# Patient Record
Sex: MALE | Race: WHITE | NOT HISPANIC OR LATINO
[De-identification: names, ages, dates, MRNs, and addresses within clinical notes are randomized per-mention and may not be internally consistent; named-entity substitution may affect disease eponyms.]

---

## 2017-01-26 ENCOUNTER — APPOINTMENT (OUTPATIENT)
Dept: ORTHOPEDIC SURGERY | Facility: CLINIC | Age: 62
End: 2017-01-26

## 2017-01-26 VITALS
DIASTOLIC BLOOD PRESSURE: 76 MMHG | WEIGHT: 170 LBS | HEIGHT: 70 IN | HEART RATE: 81 BPM | SYSTOLIC BLOOD PRESSURE: 111 MMHG | TEMPERATURE: 98.2 F | BODY MASS INDEX: 24.34 KG/M2

## 2017-01-26 DIAGNOSIS — G56.21 LESION OF ULNAR NERVE, RIGHT UPPER LIMB: ICD-10-CM

## 2017-01-26 DIAGNOSIS — G56.01 CARPAL TUNNEL SYNDROME, RIGHT UPPER LIMB: ICD-10-CM

## 2017-05-01 ENCOUNTER — OUTPATIENT (OUTPATIENT)
Dept: OUTPATIENT SERVICES | Facility: HOSPITAL | Age: 62
LOS: 1 days | End: 2017-05-01
Payer: COMMERCIAL

## 2017-05-09 DIAGNOSIS — R53.1 WEAKNESS: ICD-10-CM

## 2017-05-09 DIAGNOSIS — S06.0X9D CONCUSSION WITH LOSS OF CONSCIOUSNESS OF UNSPECIFIED DURATION, SUBSEQUENT ENCOUNTER: ICD-10-CM

## 2017-05-09 DIAGNOSIS — M54.2 CERVICALGIA: ICD-10-CM

## 2017-05-09 DIAGNOSIS — R42 DIZZINESS AND GIDDINESS: ICD-10-CM

## 2017-05-09 DIAGNOSIS — G56.01 CARPAL TUNNEL SYNDROME, RIGHT UPPER LIMB: ICD-10-CM

## 2017-05-11 ENCOUNTER — APPOINTMENT (OUTPATIENT)
Dept: PHYSICAL MEDICINE AND REHAB | Facility: CLINIC | Age: 62
End: 2017-05-11

## 2017-05-11 VITALS
DIASTOLIC BLOOD PRESSURE: 83 MMHG | OXYGEN SATURATION: 99 % | HEART RATE: 82 BPM | SYSTOLIC BLOOD PRESSURE: 136 MMHG | HEIGHT: 70 IN | BODY MASS INDEX: 24.34 KG/M2 | WEIGHT: 170 LBS

## 2017-05-11 DIAGNOSIS — Z83.3 FAMILY HISTORY OF DIABETES MELLITUS: ICD-10-CM

## 2017-05-11 DIAGNOSIS — R42 DIZZINESS AND GIDDINESS: ICD-10-CM

## 2017-05-11 DIAGNOSIS — S06.0X9A CONCUSSION WITH LOSS OF CONSCIOUSNESS OF UNSPECIFIED DURATION, INITIAL ENCOUNTER: ICD-10-CM

## 2017-06-02 PROCEDURE — 97140 MANUAL THERAPY 1/> REGIONS: CPT

## 2017-06-02 PROCEDURE — 97010 HOT OR COLD PACKS THERAPY: CPT

## 2017-06-02 PROCEDURE — 97110 THERAPEUTIC EXERCISES: CPT

## 2018-09-06 ENCOUNTER — OUTPATIENT (OUTPATIENT)
Dept: OUTPATIENT SERVICES | Facility: HOSPITAL | Age: 63
LOS: 1 days | End: 2018-09-06
Payer: COMMERCIAL

## 2018-09-06 DIAGNOSIS — M54.5 LOW BACK PAIN: ICD-10-CM

## 2018-09-06 DIAGNOSIS — M54.2 CERVICALGIA: ICD-10-CM

## 2018-09-06 DIAGNOSIS — Z51.89 ENCOUNTER FOR OTHER SPECIFIED AFTERCARE: ICD-10-CM

## 2019-01-04 PROCEDURE — G8982: CPT | Mod: CJ

## 2019-01-04 PROCEDURE — 97163 PT EVAL HIGH COMPLEX 45 MIN: CPT

## 2019-01-04 PROCEDURE — G8981: CPT | Mod: CL

## 2019-01-04 PROCEDURE — 97110 THERAPEUTIC EXERCISES: CPT

## 2019-01-04 PROCEDURE — 97010 HOT OR COLD PACKS THERAPY: CPT

## 2019-01-04 PROCEDURE — 97140 MANUAL THERAPY 1/> REGIONS: CPT

## 2019-05-07 ENCOUNTER — OUTPATIENT (OUTPATIENT)
Dept: OUTPATIENT SERVICES | Facility: HOSPITAL | Age: 64
LOS: 1 days | End: 2019-05-07
Payer: COMMERCIAL

## 2019-05-07 DIAGNOSIS — Z51.89 ENCOUNTER FOR OTHER SPECIFIED AFTERCARE: ICD-10-CM

## 2019-05-07 DIAGNOSIS — M54.5 LOW BACK PAIN: ICD-10-CM

## 2019-05-07 DIAGNOSIS — M54.2 CERVICALGIA: ICD-10-CM

## 2019-06-26 PROCEDURE — 97140 MANUAL THERAPY 1/> REGIONS: CPT

## 2019-06-26 PROCEDURE — 97010 HOT OR COLD PACKS THERAPY: CPT

## 2019-06-26 PROCEDURE — 97110 THERAPEUTIC EXERCISES: CPT

## 2019-06-26 PROCEDURE — 97162 PT EVAL MOD COMPLEX 30 MIN: CPT

## 2022-04-13 ENCOUNTER — APPOINTMENT (OUTPATIENT)
Dept: ORTHOPEDIC SURGERY | Facility: CLINIC | Age: 67
End: 2022-04-13
Payer: OTHER MISCELLANEOUS

## 2022-04-13 VITALS — WEIGHT: 170 LBS | BODY MASS INDEX: 24.34 KG/M2 | HEIGHT: 70 IN

## 2022-04-13 PROCEDURE — 99214 OFFICE O/P EST MOD 30 MIN: CPT

## 2022-04-13 PROCEDURE — 99072 ADDL SUPL MATRL&STAF TM PHE: CPT

## 2022-04-13 NOTE — WORK
[Total] : total [Does not reveal pre-existing condition(s) that may affect treatment/prognosis] : does not reveal pre-existing condition(s) that may affect treatment/prognosis [Cannot return to work because ________] : cannot return to work because [unfilled] [Rx may affect patient's ability to return to work, make patient drowsy, or other issue] : Rx may affect patient's ability to return to work, make patient drowsy, or other issue. [I provided the services listed above] :  I provided the services listed above.

## 2022-04-13 NOTE — DISCUSSION/SUMMARY
[de-identified] : Stable on medications - it is medically necessary for him to have the gabapentin and the celebrex as these medications have provided long term relief - continue PT once monthly - will add lidoderm patches - no change in disability - fu 3 months.

## 2022-04-13 NOTE — HISTORY OF PRESENT ILLNESS
[Neck] : neck [Upper back] : upper back [Mid-back] : mid-back [Lower back] : lower back [10] : 10 [6] : 6 [Burning] : burning [Sharp] : sharp [Throbbing] : throbbing [Intermittent] : intermittent [Walking] : walking [Exercising] : exercising [Stairs] : stairs [Retired] : Work status: retired [de-identified] : WC 10/9/15\par \par 6/24/20: here for fu neck/back - neck bothering him quite a bit - right arm bothering him quite a bit \par \par Lower back pain remains - radiates down the back of the rigth leg\par \par Using gabapentin/Celebrex\par \par xrays today:\par C spine 4 views - loss of disc height from C5-7\par \par 9/28/20: Here for f/u - plan at last was "medication renewed - PT in order to reduce the symptoms - requires medication in order to control the symptoms - the pain remains either way but there is less with the medication and without he basically cant move at all- fu with me in a few months" - overall the pain remains in the same capacity - pain ranging from 5-10 based on activity - left arm is okay - all the way down the right arm - medications help - has medications - right hand is okay - no loss of bb control- PT helps quite a bit currently\par \par Lower back pain as well \par \par Not able to work in any capacity at this point\par \par 1/11/21: Here to follow up. Plan at last visit was "Celebrex/gabapenitin refilled - not able to return to work - hes going to see Dr Bradley for injections"\par \par 5/19/21: Here for f/u. Plan at last "We discussed case. Having recent GI symptoms. Will add Pepcid 20mg bid and decrease Celebrex to 100mg. Will try to hold Celebrex and see if better. Denies bloody stools. Will follow up with GI"\par \par 6/30/21: Here for f/u. Plan at last "Celebrex/gabapentin. He requires Celebrex due to other medications on the formulary not compatible with his GI issues.\par He requires Gabapentin for neuropathic pain and there is no substitute" He is at baseline with his pain and sx. He recently received his Celebrex a few days ago and it has been beneficial to his pain and sx\par \par 8/11/21: Here for f/u. Plan at last "Celebrex/gabapenitin. He requires Celebrex due to other medications on the formulary not compatible with his GI issues.\par He requires Gabapentin for neuropathic pain and there is no substitute." He continues to remain at his baseline with pain and dysfunction. He has been doing well with PT, Celebrex, and gabapentin.\par \par 10/6/21: Here for f/u. Plan at last "Celebrex/gabapenitin. He requires Celebrex due to other medications on the formulary not compatible with his GI issues.\par He requires Gabapentin for neuropathic pain and there is no substitute." He had a flare up of the pain a month ago, possibly due to doing yard work at the time. Today, he is at his baseline with pain and dysfunction. Continues to do well with PT, Celebrex, and gabapentin.\par \par 11/24/21: Here for f/up. He has been doing well with PT, Celebrex, and gabapentin. He does not need refill today. He is currently waiting on approval for injections with Dr. Bradley.\par \par 1/26/22: Here for f/up. He has less stomach issues now when switched from Celebrex 200mg to 100mg. Doing well with PT, Celebrex, and gabapentin. No significant changes in his symptoms and function.\par \par 4/13/22: Here for follow-up; plan last visit was, "Continue with PT, Celebrex, and gabapentin. Sent lido topical gel as he has benefited from this in the past.He is working on scheduling LESIs with Dr. Bradley. f/up in 2-3 months." Overall doing a little better - he had cervical ablations as well as lumbar HO with about 40% improvement.  Pain is to the base of the neck  radiating down the right arm and low back with predominanty radiatiing down the right leg. He does PT about one monthly which he finds beneficial, and also takes the gabapentin and celebrex. He is retired. [] : Post Surgical Visit: no [FreeTextEntry7] : down both legs  [de-identified] : yard work  [de-identified] : Physical Therapy and an epidural  [de-identified] : 2/2022

## 2022-04-13 NOTE — PHYSICAL EXAM
[Flexion] : flexion [Extension] : extension [Rotation to left] : rotation to left [Rotation to right] : rotation to right [1] : right 1st digit [2] : right 2nd digit [3] : right 3rd digit [4] : right 4th digit [5] : right 5th digit [] : sensation of right upper extremities not intact [TWNoteComboBox7] : forward flexion 30 degrees [de-identified] : extension 10 degrees

## 2022-04-18 ENCOUNTER — APPOINTMENT (OUTPATIENT)
Dept: PAIN MANAGEMENT | Facility: CLINIC | Age: 67
End: 2022-04-18
Payer: OTHER MISCELLANEOUS

## 2022-04-18 VITALS — WEIGHT: 170 LBS | BODY MASS INDEX: 24.34 KG/M2 | HEIGHT: 70 IN

## 2022-04-18 PROCEDURE — 99072 ADDL SUPL MATRL&STAF TM PHE: CPT

## 2022-04-18 PROCEDURE — 99214 OFFICE O/P EST MOD 30 MIN: CPT

## 2022-04-18 NOTE — ASSESSMENT
[FreeTextEntry1] : Patient is presenting with acute/sub-acute radicular pain with impairment in ADLs and functionality.  The pain has not responded to  conservative care including nsaid therapy and/or physical therapy.  There is no bleeding tendency, unstable medical condition, or systemic infection.\par \par After discussing various treatment options with the patient including but not limited to oral medications, physical therapy, exercise, modalities as well as interventional spinal injections, we have decided with the following plan:\par I personally reviewed the MRI/CT scan images and agree with the radiologist's report. The radiological findings were discussed with the patient.\par The risks, benefits, contents and alternatives to injection were explained in full to the patient. Risks outlined include but are not limited to infection,sepsis, bleeding, post-dural puncture headache, nerve damage, temporary increase in pain, syncopal episode, failure to resolve symptoms, allergic reaction, symptom recurrence, and elevation of blood sugar in diabetics. Cortisone may cause immunosuppression. Patient understands the risks. All questions were answered. After discussion of options, patient requested an injection. Information regarding the injection was given to the patient. Which medications to stop prior to the injection was explained to the patient as well.\par Follow up in 1-2 weeks post injection for re-evaluation. \par Continue Home exercises, stretching, activity modification, physical therapy, and conservative care.\par Patient is presenting with acute/sub-acute radicular pain with impairment in ADLs and functionality. The pain has not responded to conservative care including nsaid therapy and/or physical therapy. There is no bleeding tendency, unstable medical condition, or systemic infection.\par \par

## 2022-04-18 NOTE — PHYSICAL EXAM
[Rotation to left] : rotation to left [Rotation to right] : rotation to right [5___] : left hamstring 5[unfilled]/5 [4___] : right hamstring 4[unfilled]/5 [] : positive straight leg raise

## 2022-04-18 NOTE — HISTORY OF PRESENT ILLNESS
[Neck] : neck [Lower back] : lower back [10] : 10 [4] : 4 [Sharp] : sharp [Shooting] : shooting [] : yes [Constant] : constant [Heat] : heat [Sitting] : sitting [Standing] : standing [Walking] : walking [FreeTextEntry1] : 04/18/2022: follow up today.  Had 80% relief from right cervical RFA on 2/16 and 60% relief from LESI L5-S1 on 2/23.\par \par 11/10/21: follow up today. Not seen by me since 2018. Has been following with Dr. Mijares. Stable on current meds.\par Pain is in neck. Pain is in the right side of neck and occasionally gets numbness into right hand. Pain in back is in both\par sides and will go down right leg. Had LESI's with me back in 2017 with improvement of his pain >50% with improvement\par of his function and overall well being. Has been off of narcotics for some time. He has continued pain in the right neck,\par had 2 successful cervical MBBs in the past. using heat PRN.\par 2020: Per Dr. Lopez: He c/o neck and low back pain as a result of a work related injury on 10/9/15. No change in pain\par pattern. Obtains significant benefit with physical therapy, improvement in ROM and he is able to perform ADLs. Had right\par cervical MBB x2 with reported 15% improvement. States continued sustained relief after last left cervical RFA 6/2019.\par Attempted modalities for current pain complaint:\par See above:\par Medications: Yes\par 1) Celebrex PRN\par 2) Gabapentin 300 mg qhs\par Injections: Yes\par 1) Left C2-C3, C3-C4, C4-C5 RFA (6/25/18, 6/24/19) - Dr. Bradley\par 2) Right C2-C3, C3-C4, C4-C5 MBB (7/8/19, 12/9/19) - Dr. Bradley\par Previous Spine Surgery: N/A\par Imaging:\par MRI Cervical Spine (1/16/19) - O/C\par MRI Lumbar Spine (11/4/19) - O/C [FreeTextEntry7] : right leg [FreeTextEntry9] : Stretch

## 2022-07-13 ENCOUNTER — APPOINTMENT (OUTPATIENT)
Dept: ORTHOPEDIC SURGERY | Facility: CLINIC | Age: 67
End: 2022-07-13

## 2022-07-13 PROCEDURE — 99072 ADDL SUPL MATRL&STAF TM PHE: CPT

## 2022-07-13 PROCEDURE — 99214 OFFICE O/P EST MOD 30 MIN: CPT

## 2022-07-14 NOTE — HISTORY OF PRESENT ILLNESS
[Neck] : neck [Upper back] : upper back [Lower back] : lower back [Gradual] : gradual [Sudden] : sudden [Burning] : burning [Shooting] : shooting [Stabbing] : stabbing [Intermittent] : intermittent [Mid-back] : mid-back [10] : 10 [6] : 6 [Sharp] : sharp [Throbbing] : throbbing [Walking] : walking [Exercising] : exercising [Stairs] : stairs [Retired] : Work status: retired [de-identified] : WC 10/9/15\par \par 6/24/20: here for fu neck/back - neck bothering him quite a bit - right arm bothering him quite a bit \par \par Lower back pain remains - radiates down the back of the rigth leg\par \par Using gabapentin/Celebrex\par \par xrays today:\par C spine 4 views - loss of disc height from C5-7\par \par 9/28/20: Here for f/u - plan at last was "medication renewed - PT in order to reduce the symptoms - requires medication in order to control the symptoms - the pain remains either way but there is less with the medication and without he basically cant move at all- fu with me in a few months" - overall the pain remains in the same capacity - pain ranging from 5-10 based on activity - left arm is okay - all the way down the right arm - medications help - has medications - right hand is okay - no loss of bb control- PT helps quite a bit currently\par \par Lower back pain as well \par \par Not able to work in any capacity at this point\par \par 1/11/21: Here to follow up. Plan at last visit was "Celebrex/gabapenitin refilled - not able to return to work - hes going to see Dr Bradley for injections"\par \par 5/19/21: Here for f/u. Plan at last "We discussed case. Having recent GI symptoms. Will add Pepcid 20mg bid and decrease Celebrex to 100mg. Will try to hold Celebrex and see if better. Denies bloody stools. Will follow up with GI"\par \par 6/30/21: Here for f/u. Plan at last "Celebrex/gabapentin. He requires Celebrex due to other medications on the formulary not compatible with his GI issues.\par He requires Gabapentin for neuropathic pain and there is no substitute" He is at baseline with his pain and sx. He recently received his Celebrex a few days ago and it has been beneficial to his pain and sx\par \par 8/11/21: Here for f/u. Plan at last "Celebrex/gabapenitin. He requires Celebrex due to other medications on the formulary not compatible with his GI issues.\par He requires Gabapentin for neuropathic pain and there is no substitute." He continues to remain at his baseline with pain and dysfunction. He has been doing well with PT, Celebrex, and gabapentin.\par \par 10/6/21: Here for f/u. Plan at last "Celebrex/gabapenitin. He requires Celebrex due to other medications on the formulary not compatible with his GI issues.\par He requires Gabapentin for neuropathic pain and there is no substitute." He had a flare up of the pain a month ago, possibly due to doing yard work at the time. Today, he is at his baseline with pain and dysfunction. Continues to do well with PT, Celebrex, and gabapentin.\par \par 11/24/21: Here for f/up. He has been doing well with PT, Celebrex, and gabapentin. He does not need refill today. He is currently waiting on approval for injections with Dr. Bradley.\par \par 1/26/22: Here for f/up. He has less stomach issues now when switched from Celebrex 200mg to 100mg. Doing well with PT, Celebrex, and gabapentin. No significant changes in his symptoms and function.\par \par 4/13/22: Here for follow-up; plan last visit was, "Continue with PT, Celebrex, and gabapentin. Sent lido topical gel as he has benefited from this in the past.He is working on scheduling LESIs with Dr. Bradley. f/up in 2-3 months." Overall doing a little better - he had cervical ablations as well as lumbar HO with about 40% improvement.  Pain is to the base of the neck  radiating down the right arm and low back with predominanty radiatiing down the right leg. He does PT about one monthly which he finds beneficial, and also takes the gabapentin and celebrex. He is retired.\par \par 7/13/22: Here for follow up. Overall about the same. Lidoderm patches give mild help. Continues PT and HEP.  [] : Post Surgical Visit: no [FreeTextEntry1] : C spine and L spine  [FreeTextEntry7] : down both legs  [de-identified] : yard work  [de-identified] : Physical Therapy and an epidural  [de-identified] : 2/2022

## 2022-07-14 NOTE — DISCUSSION/SUMMARY
[de-identified] : Stable on medications - it is medically necessary for him to have the lidoderm patches, gabapentin and the celebrex as these medications have provided long term relief - continue PT once monthly - FU with Pain mngmt - no change in disability - fu 3 months.\par

## 2022-10-12 ENCOUNTER — APPOINTMENT (OUTPATIENT)
Dept: ORTHOPEDIC SURGERY | Facility: CLINIC | Age: 67
End: 2022-10-12

## 2022-10-12 PROCEDURE — 99214 OFFICE O/P EST MOD 30 MIN: CPT | Mod: 25

## 2022-10-12 PROCEDURE — 99072 ADDL SUPL MATRL&STAF TM PHE: CPT

## 2022-10-12 PROCEDURE — J3490M: CUSTOM

## 2022-10-12 PROCEDURE — 99213 OFFICE O/P EST LOW 20 MIN: CPT | Mod: 25

## 2022-10-12 PROCEDURE — 20552 NJX 1/MLT TRIGGER POINT 1/2: CPT

## 2022-10-12 RX ORDER — GABAPENTIN 300 MG/1
300 CAPSULE ORAL
Qty: 60 | Refills: 2 | Status: ACTIVE | COMMUNITY
Start: 2022-04-13 | End: 1900-01-01

## 2022-10-12 NOTE — PROCEDURE
[Trigger point 1-2 muscle groups] : trigger point 1-2 muscle groups [Right] : of the right [Cervical paraspinal muscle] : cervical paraspinal muscle [Pain] : pain [Inflammation] : inflammation [Alcohol] : alcohol [Betadine] : betadine [Ethyl Chloride sprayed topically] : ethyl chloride sprayed topically [Sterile technique used] : sterile technique used [___ cc    6mg] :  Betamethasone (Celestone) ~Vcc of 6mg [___ cc    1%] : Lidocaine ~Vcc of 1%  [___ cc    0.25%] : Bupivacaine (Marcaine) ~Vcc of 0.25%  [___ cc    10mg] : Triamcinolone (Kenalog) ~Vcc of 10 mg  [] : Patient tolerated procedure well [Call if redness, pain or fever occur] : call if redness, pain or fever occur [Apply ice for 15min out of every hour for the next 12-24 hours as tolerated] : apply ice for 15 minutes out of every hour for the next 12-24 hours as tolerated [Patient was advised to rest the joint(s) for ____ days] : patient was advised to rest the joint(s) for [unfilled] days

## 2022-10-12 NOTE — PHYSICAL EXAM
[Flexion] : flexion [Extension] : extension [Rotation to left] : rotation to left [Rotation to right] : rotation to right [1] : right 1st digit [2] : right 2nd digit [3] : right 3rd digit [4] : right 4th digit [5] : right 5th digit [] : sensation of right upper extremities not intact [TWNoteComboBox7] : forward flexion 30 degrees [de-identified] : extension 10 degrees

## 2022-10-12 NOTE — DISCUSSION/SUMMARY
[de-identified] : Stable on medications - it is medically necessary for him to have the lidoderm patches, gabapentin and the celebrex as these medications have provided long term relief - continue PT-  FU with Pain mngmt - TPI given to the right paraspinal area -no change in disability - fu 3 months.\par \par Instructions: Entered by Delfina KWAN acting as scribe.\par \par Dr. Mijares -\par The documentation recorded by the scribe accurately reflects the service I personally performed and the decisions made by me.\par \par

## 2022-10-12 NOTE — HISTORY OF PRESENT ILLNESS
[Neck] : neck [Upper back] : upper back [Mid-back] : mid-back [Lower back] : lower back [Gradual] : gradual [Sudden] : sudden [10] : 10 [6] : 6 [Burning] : burning [Sharp] : sharp [Shooting] : shooting [Stabbing] : stabbing [Throbbing] : throbbing [Intermittent] : intermittent [Walking] : walking [Exercising] : exercising [Stairs] : stairs [Retired] : Work status: retired [de-identified] : WC 10/9/15\par \par 6/24/20: here for fu neck/back - neck bothering him quite a bit - right arm bothering him quite a bit \par \par Lower back pain remains - radiates down the back of the rigth leg\par \par Using gabapentin/Celebrex\par \par xrays today:\par C spine 4 views - loss of disc height from C5-7\par \par 9/28/20: Here for f/u - plan at last was "medication renewed - PT in order to reduce the symptoms - requires medication in order to control the symptoms - the pain remains either way but there is less with the medication and without he basically cant move at all- fu with me in a few months" - overall the pain remains in the same capacity - pain ranging from 5-10 based on activity - left arm is okay - all the way down the right arm - medications help - has medications - right hand is okay - no loss of bb control- PT helps quite a bit currently\par \par Lower back pain as well \par \par Not able to work in any capacity at this point\par \par 1/11/21: Here to follow up. Plan at last visit was "Celebrex/gabapenitin refilled - not able to return to work - hes going to see Dr Bradley for injections"\par \par 5/19/21: Here for f/u. Plan at last "We discussed case. Having recent GI symptoms. Will add Pepcid 20mg bid and decrease Celebrex to 100mg. Will try to hold Celebrex and see if better. Denies bloody stools. Will follow up with GI"\par \par 6/30/21: Here for f/u. Plan at last "Celebrex/gabapentin. He requires Celebrex due to other medications on the formulary not compatible with his GI issues.\par He requires Gabapentin for neuropathic pain and there is no substitute" He is at baseline with his pain and sx. He recently received his Celebrex a few days ago and it has been beneficial to his pain and sx\par \par 8/11/21: Here for f/u. Plan at last "Celebrex/gabapenitin. He requires Celebrex due to other medications on the formulary not compatible with his GI issues.\par He requires Gabapentin for neuropathic pain and there is no substitute." He continues to remain at his baseline with pain and dysfunction. He has been doing well with PT, Celebrex, and gabapentin.\par \par 10/6/21: Here for f/u. Plan at last "Celebrex/gabapenitin. He requires Celebrex due to other medications on the formulary not compatible with his GI issues.\par He requires Gabapentin for neuropathic pain and there is no substitute." He had a flare up of the pain a month ago, possibly due to doing yard work at the time. Today, he is at his baseline with pain and dysfunction. Continues to do well with PT, Celebrex, and gabapentin.\par \par 11/24/21: Here for f/up. He has been doing well with PT, Celebrex, and gabapentin. He does not need refill today. He is currently waiting on approval for injections with Dr. Bradley.\par \par 1/26/22: Here for f/up. He has less stomach issues now when switched from Celebrex 200mg to 100mg. Doing well with PT, Celebrex, and gabapentin. No significant changes in his symptoms and function.\par \par 4/13/22: Here for follow-up; plan last visit was, "Continue with PT, Celebrex, and gabapentin. Sent lido topical gel as he has benefited from this in the past.He is working on scheduling LESIs with Dr. Bradley. f/up in 2-3 months." Overall doing a little better - he had cervical ablations as well as lumbar HO with about 40% improvement.  Pain is to the base of the neck  radiating down the right arm and low back with predominanty radiatiing down the right leg. He does PT about one monthly which he finds beneficial, and also takes the gabapentin and celebrex. He is retired.\par \par 7/13/22: Here for follow up. Overall about the same. Lidoderm patches give mild help. Continues PT and HEP. \par \par 10/12/22: Here for fu; plan last visit was, " Stable on medications - it is medically necessary for him to have the lidoderm patches, gabapentin and the celebrex as these medications have provided long term relief - continue PT once monthly - FU with Pain mngmt - no change in disability - fu 3 months." Overall pain to the right side of the neck that persists with stiffness. Shooting pain down the right arm at times. The neck is worse today. Pain persists also to the low back; he takes the celebrex, lidoderm patches and gabapentin with partial relief of his pain. He is retired.  [] : Post Surgical Visit: no [FreeTextEntry1] : C spine and L spine  [FreeTextEntry7] : down both legs  [de-identified] : yard work  [de-identified] : Physical Therapy and an epidural  [de-identified] : 2/2022

## 2022-11-07 ENCOUNTER — APPOINTMENT (OUTPATIENT)
Dept: PAIN MANAGEMENT | Facility: CLINIC | Age: 67
End: 2022-11-07

## 2023-01-11 ENCOUNTER — APPOINTMENT (OUTPATIENT)
Dept: ORTHOPEDIC SURGERY | Facility: CLINIC | Age: 68
End: 2023-01-11
Payer: OTHER MISCELLANEOUS

## 2023-01-11 VITALS — HEIGHT: 70 IN | WEIGHT: 176 LBS | BODY MASS INDEX: 25.2 KG/M2

## 2023-01-11 PROCEDURE — 99072 ADDL SUPL MATRL&STAF TM PHE: CPT

## 2023-01-11 PROCEDURE — 99214 OFFICE O/P EST MOD 30 MIN: CPT

## 2023-01-11 NOTE — PHYSICAL EXAM
[Flexion] : flexion [Extension] : extension [Rotation to left] : rotation to left [Rotation to right] : rotation to right [1] : right 1st digit [2] : right 2nd digit [3] : right 3rd digit [4] : right 4th digit [5] : right 5th digit [] : sensation of right upper extremities not intact [TWNoteComboBox7] : forward flexion 30 degrees [de-identified] : extension 10 degrees

## 2023-01-11 NOTE — HISTORY OF PRESENT ILLNESS
[Neck] : neck [Upper back] : upper back [Mid-back] : mid-back [Lower back] : lower back [Gradual] : gradual [Sudden] : sudden [10] : 10 [6] : 6 [Burning] : burning [Sharp] : sharp [Shooting] : shooting [Stabbing] : stabbing [Throbbing] : throbbing [Intermittent] : intermittent [Walking] : walking [Exercising] : exercising [Stairs] : stairs [Retired] : Work status: retired [de-identified] : WC 10/9/15\par \par 6/24/20: here for fu neck/back - neck bothering him quite a bit - right arm bothering him quite a bit \par \par Lower back pain remains - radiates down the back of the rigth leg\par \par Using gabapentin/Celebrex\par \par xrays today:\par C spine 4 views - loss of disc height from C5-7\par \par 9/28/20: Here for f/u - plan at last was "medication renewed - PT in order to reduce the symptoms - requires medication in order to control the symptoms - the pain remains either way but there is less with the medication and without he basically cant move at all- fu with me in a few months" - overall the pain remains in the same capacity - pain ranging from 5-10 based on activity - left arm is okay - all the way down the right arm - medications help - has medications - right hand is okay - no loss of bb control- PT helps quite a bit currently\par \par Lower back pain as well \par \par Not able to work in any capacity at this point\par \par 1/11/21: Here to follow up. Plan at last visit was "Celebrex/gabapenitin refilled - not able to return to work - hes going to see Dr Bradley for injections"\par \par 5/19/21: Here for f/u. Plan at last "We discussed case. Having recent GI symptoms. Will add Pepcid 20mg bid and decrease Celebrex to 100mg. Will try to hold Celebrex and see if better. Denies bloody stools. Will follow up with GI"\par \par 6/30/21: Here for f/u. Plan at last "Celebrex/gabapentin. He requires Celebrex due to other medications on the formulary not compatible with his GI issues.\par He requires Gabapentin for neuropathic pain and there is no substitute" He is at baseline with his pain and sx. He recently received his Celebrex a few days ago and it has been beneficial to his pain and sx\par \par 8/11/21: Here for f/u. Plan at last "Celebrex/gabapenitin. He requires Celebrex due to other medications on the formulary not compatible with his GI issues.\par He requires Gabapentin for neuropathic pain and there is no substitute." He continues to remain at his baseline with pain and dysfunction. He has been doing well with PT, Celebrex, and gabapentin.\par \par 10/6/21: Here for f/u. Plan at last "Celebrex/gabapenitin. He requires Celebrex due to other medications on the formulary not compatible with his GI issues.\par He requires Gabapentin for neuropathic pain and there is no substitute." He had a flare up of the pain a month ago, possibly due to doing yard work at the time. Today, he is at his baseline with pain and dysfunction. Continues to do well with PT, Celebrex, and gabapentin.\par \par 11/24/21: Here for f/up. He has been doing well with PT, Celebrex, and gabapentin. He does not need refill today. He is currently waiting on approval for injections with Dr. Bradley.\par \par 1/26/22: Here for f/up. He has less stomach issues now when switched from Celebrex 200mg to 100mg. Doing well with PT, Celebrex, and gabapentin. No significant changes in his symptoms and function.\par \par 4/13/22: Here for follow-up; plan last visit was, "Continue with PT, Celebrex, and gabapentin. Sent lido topical gel as he has benefited from this in the past.He is working on scheduling LESIs with Dr. Bradley. f/up in 2-3 months." Overall doing a little better - he had cervical ablations as well as lumbar HO with about 40% improvement.  Pain is to the base of the neck  radiating down the right arm and low back with predominanty radiatiing down the right leg. He does PT about one monthly which he finds beneficial, and also takes the gabapentin and celebrex. He is retired.\par \par 7/13/22: Here for follow up. Overall about the same. Lidoderm patches give mild help. Continues PT and HEP. \par \par 10/12/22: Here for fu; plan last visit was, " Stable on medications - it is medically necessary for him to have the lidoderm patches, gabapentin and the celebrex as these medications have provided long term relief - continue PT once monthly - FU with Pain mngmt - no change in disability - fu 3 months." Overall pain to the right side of the neck that persists with stiffness. Shooting pain down the right arm at times. The neck is worse today. Pain persists also to the low back; he takes the celebrex, lidoderm patches and gabapentin with partial relief of his pain. He is retired. \par \par \par 01/11/23: Here for fu; plan last visit was, " Stable on medications - it is medically necessary for him to have the lidoderm patches, gabapentin and the celebrex as these medications have provided long term relief - continue PT-  FU with Pain mngmt - TPI given to the right paraspinal area -no change in disability - fu 3 months."  Overall is stable on medication. The PT has been helpful; He has continued pain radiating down the right arm and right leg that is intermittent. He was unable to make his last pain management appointment. \par  [] : Post Surgical Visit: no [FreeTextEntry1] : C spine and L spine  [FreeTextEntry7] : down both legs  [de-identified] : yard work  [de-identified] : Physical Therapy and an epidural \par \par tpi injection  [de-identified] : 2/2022

## 2023-01-11 NOTE — DISCUSSION/SUMMARY
[de-identified] : Stable on medications - it is medically necessary for him to have the lidoderm patches, gabapentin and the celebrex as these medications have provided long term relief; will increase the gabapentin to TID - continue PT-  FU with Pain mngmt no change in disability - fu 3 months.\par \par Instructions: Entered by Delfina KWAN acting as scribe.\par \par Dr. Mijares -\par The documentation recorded by the scribe accurately reflects the service I personally performed and the decisions made by me.\par \par

## 2023-02-08 ENCOUNTER — APPOINTMENT (OUTPATIENT)
Dept: PAIN MANAGEMENT | Facility: CLINIC | Age: 68
End: 2023-02-08
Payer: OTHER MISCELLANEOUS

## 2023-02-08 VITALS — WEIGHT: 176 LBS | BODY MASS INDEX: 25.2 KG/M2 | HEIGHT: 70 IN

## 2023-02-08 PROCEDURE — 99072 ADDL SUPL MATRL&STAF TM PHE: CPT

## 2023-02-08 PROCEDURE — 99214 OFFICE O/P EST MOD 30 MIN: CPT

## 2023-02-08 NOTE — ASSESSMENT
[FreeTextEntry1] : After discussing various treatment options with the patient including but not limited to oral medications, physical therapy, exercise, modalities as well as interventional spinal injections, we have decided with the following plan:\par \par 1) Intervention Injection Therapy:\par I personally reviewed the MRI/CT scan images and agree with the radiologist's report. The radiological findings were discussed with the patient.\par The risks, benefits, contents and alternatives to injection were explained in full to the patient. Risks outlined include but are not limited to infection,sepsis, bleeding, post-dural puncture headache, nerve damage, temporary increase in pain, syncopal episode, failure to resolve symptoms, allergic reaction, symptom recurrence, and elevation of blood sugar in diabetics. Cortisone may cause immunosuppression. Patient understands the risks. All questions were answered. After discussion of options, patient requested an injection. Information regarding the injection was given to the patient. Which medications to stop prior to the injection was explained to the patient as well.\par \par Follow up in 1-2 weeks post injection for re-evaluation. \par Continue Home exercises, stretching, activity modification, physical therapy, and conservative care.\par \par Patient has cervical axial pain that is consistent with facet joint pathology.  A diagnostic temporary block with local anesthetic  of the medial branch was performed and has resulted in at least a 80% reduction in pain for the duration of the specific local anesthetic effect.  The pain is not radicular and there is absence of nerve root compression.  There is no prior spinal fusion surgery at the level targeted.  The pain has failed to respond to three months of conservative therapy.\par \par right cervical RFA C3, C4, C5, C6 as the previous RFA provided him > 80% relief for about a year. ROM now decreasing and pain getting worse. \par

## 2023-02-08 NOTE — PHYSICAL EXAM
[Rotation to left] : rotation to left [Rotation to right] : rotation to right [5___] : left hamstring 5[unfilled]/5 [4___] : right hamstring 4[unfilled]/5 [] : no ecchymosis [de-identified] : left lateral rotation 45 degrees [TWNoteComboBox6] : right lateral rotation 45 degrees

## 2023-02-08 NOTE — HISTORY OF PRESENT ILLNESS
[Neck] : neck [Lower back] : lower back [Sharp] : sharp [Shooting] : shooting [Heat] : heat [Sitting] : sitting [Standing] : standing [Walking] : walking [Work related] : work related [5] : 5 [3] : 3 [Intermittent] : intermittent [Retired] : Work status: retired [FreeTextEntry1] : 02/8/23: follow up today. He was unable to get his injection in November as his ride had cancelled on him. His pain is in his lower back and his neck.  Intermittent numbness in the right hand. Pain in the lower back with intermittent radiation down both posterior legs. \par \par 04/18/2022: follow up today.  Had 80% relief from right cervical RFA on 2/16/22 and 60% relief from LESI L5-S1 on 2/23/22.\par \par 11/10/21: follow up today. Not seen by me since 2018. Has been following with Dr. Mijares. Stable on current meds.Pain is in neck. Pain is in the right side of neck and occasionally gets numbness into right hand. Pain in back is in both sides and will go down right leg. Had LESI's with me back in 2017 with improvement of his pain >50% with improvement of his function and overall well being. Has been off of narcotics for some time. He has continued pain in the right neck, had 2 successful cervical MBBs in the past. using heat PRN.\par \par \par 2020: Per Dr. Lopez: He c/o neck and low back pain as a result of a work related injury on 10/9/15. No change in pain pattern. Obtains significant benefit with physical therapy, improvement in ROM and he is able to perform ADLs. Had right cervical MBB x2 with reported 15% improvement. States continued sustained relief after last left cervical RFA 6/2019.\par \par Attempted modalities for current pain complaint:\par See above:\par \par Medications: Yes\par 1) Celebrex PRN\par 2) Gabapentin 300 mg qhs\par \par Injections: Yes\par 1) Left C2-C3, C3-C4, C4-C5 RFA (6/25/18, 6/24/19) \par 2) Right C2-C3, C3-C4, C4-C5 MBB (7/8/19, 12/9/19) \par 3) Right cervical RFA (2/16/22)\par 4) LESI L5/S1 (2/23/22)\par \par Previous Spine Surgery: N/A\par Imaging:\par MRI Cervical Spine (1/16/19) - O/C C2-C3: There is no posterior disc herniation. \par C3-C4: There is diffuse disc bulging, bony ridging, right foraminal protrusion, uncovertebral joint hypertrophy, and \par right exiting C4 nerve root impingement with right greater than left foraminal narrowing. \par C4-C5: There is diffuse disc bulging and bony ridging asymmetric towards the left with uncovertebral joint \par hypertrophy and moderate left greater than right neural foraminal narrowing. \par C5-C6: There is mild diffuse disc bulging, bony ridging, and mild foraminal narrowing left greater than right. \par C6-C7: There is mild diffuse disc bulging, bony ridging, and foraminal narrowing, left greater than right. \par C7-T1: There is diffuse disc bulging, bony ridging, and uncovertebral joint hypertrophy with encroachment upon left  greater than right exiting C8 nerve roots. \par \par MRI Lumbar Spine (11/4/19) - O/C [] : no [FreeTextEntry3] : 10/9/15 [FreeTextEntry7] : right leg [FreeTextEntry9] : Stretch

## 2023-03-06 ENCOUNTER — APPOINTMENT (OUTPATIENT)
Dept: PAIN MANAGEMENT | Facility: CLINIC | Age: 68
End: 2023-03-06
Payer: OTHER MISCELLANEOUS

## 2023-03-06 PROCEDURE — 99072 ADDL SUPL MATRL&STAF TM PHE: CPT

## 2023-03-06 PROCEDURE — J3490M: CUSTOM

## 2023-03-06 PROCEDURE — 64633 DESTROY CERV/THOR FACET JNT: CPT | Mod: RT

## 2023-03-06 PROCEDURE — 64634Z: CUSTOM | Mod: 59,RT

## 2023-03-06 NOTE — PROCEDURE
[FreeTextEntry3] : Date of Service: 03/06/2023 \par \par Account: [id number]\par \par Patient: ANGEL DIAZ \par \par YOB: 1955\par \par Age: 67 year\par \par \par RIGHT RFA CERVICAL UPPER FACET\par \par Surgeon: Stephania Bradley M.D.\par \par Assistant: None.\par \par Pre-Operative Diagnosis: \par \par Post Operative Diagnosis: \par \par Procedure: Right Third Occipital Nerve, C3, C4, C5 Medial Branch Radiofrequency Neurolysis under fluoroscopic guidance.               \par \par Anesthesia:             MAC\par \par \par This procedure was carried out using fluoroscopic guidance.  The risks and benefits of the procedure were discussed extensively with the patient.  The consent of the patient was obtained and the following procedure was performed.\par \par The patient was placed in the prone position.  The patient's neck was prepped and draped in a sterile fashion.  The C2,C3,C4,C5 vertebral levels were identified and the fluoroscope right obliqued to approximately 10 degrees to reveal the "waste" of the left articular pillars at the C2,C3,C4,C5 levels and these were marked.  The skin at these target points was then localized using 1 cc of 1% Lidocaine without epinephrine at each injection site.  A 20 Gauge 100mm clarissa needle with a 5mm active curved tip was then introduced and advanced at these four levels to the above target points, and under lateral view the needle tip was advanced a few millimeters and confirmed at the middle of the classical "trapezius" structure on oss. \par \par The stylette for the most cephalad needle at the C2 level was then removed and a Whiteface radiofrequency probe was then placed inside the needle. After impedences were checked at approximately 240 ohm, 50 hertz sensory stimulation was performed.  Patient experienced concordant pain in their right neck at approximately 0.6 volts. The voltage was then increased to 1 volt. The patient reported increased right neck pain symptoms without any radiation below the left shoulder.  Stimulation was then changed to 2 hertz and increased slowly by 0.1 volt increments at approximately 0.8 volts, patient began to experience thumping like reproductive pain in their right neck. The voltage was then increased to approximately 2.5 volts. Patient experienced increasing thumping without any sensation below his right shoulder. This exact stimulation was then repeated for the C3,C4,C5 needles reproductive right sided neck pain and no radiation below the right shoulder. The 4 levels were then anesthetized with approximately 0.5 cc's of 1% Lidocaine. After which each area was then ablated at 80 degrees centigrade for 60 seconds each. Patient felt no pain reproduction during the ablation procedure.  After each of these levels were ablated, approximately 0.5 cc of 0.25% Bupivacaine plus 12 mg of Celestone were then injected before the needles were removed.  Pressure was then applied to the neck.  Band-aids were applied.  Patient was brought to the recovery, ambulated on their own after the procedure and reported decreased right sided neck pain. \par \par Anesthesia personnel were present throughout the procedure. Patient was told to apply ice to the neck for 20 minutes on and 20 minutes off for 24-48 hours. Patient is to call the office if they had any questions or concerns.   The patient was also instructed to contact me immediately if there were any problems.\par \par Stephania Bradley M.D.\par

## 2023-03-20 ENCOUNTER — APPOINTMENT (OUTPATIENT)
Dept: PAIN MANAGEMENT | Facility: CLINIC | Age: 68
End: 2023-03-20
Payer: OTHER MISCELLANEOUS

## 2023-03-20 VITALS — HEIGHT: 70 IN | BODY MASS INDEX: 25.2 KG/M2 | WEIGHT: 176 LBS

## 2023-03-20 PROCEDURE — 99214 OFFICE O/P EST MOD 30 MIN: CPT

## 2023-03-20 NOTE — ASSESSMENT
[FreeTextEntry1] : After discussing various treatment options with the patient including but not limited to oral medications, physical therapy, exercise, modalities as well as interventional spinal injections, we have decided with the following plan:\par \par 1) Intervention Injection Therapy:\par I personally reviewed the MRI/CT scan images and agree with the radiologist's report. The radiological findings were discussed with the patient.\par The risks, benefits, contents and alternatives to injection were explained in full to the patient. Risks outlined include but are not limited to infection,sepsis, bleeding, post-dural puncture headache, nerve damage, temporary increase in pain, syncopal episode, failure to resolve symptoms, allergic reaction, symptom recurrence, and elevation of blood sugar in diabetics. Cortisone may cause immunosuppression. Patient understands the risks. All questions were answered. After discussion of options, patient requested an injection. Information regarding the injection was given to the patient. Which medications to stop prior to the injection was explained to the patient as well.\par \par Follow up in 1-2 weeks post injection for re-evaluation. \par Continue Home exercises, stretching, activity modification, physical therapy, and conservative care.\par \par Patient has cervical axial pain that is consistent with facet joint pathology.  A diagnostic temporary block with local anesthetic  of the medial branch was performed and has resulted in at least a 80% reduction in pain for the duration of the specific local anesthetic effect.  The pain is not radicular and there is absence of nerve root compression.  There is no prior spinal fusion surgery at the level targeted.  The pain has failed to respond to three months of conservative therapy.\par \par LESI L5-S1\par

## 2023-03-20 NOTE — PHYSICAL EXAM
[Rotation to left] : rotation to left [Rotation to right] : rotation to right [5___] : left hamstring 5[unfilled]/5 [4___] : right hamstring 4[unfilled]/5 [] : no ecchymosis [de-identified] : left lateral rotation 45 degrees [TWNoteComboBox6] : right lateral rotation 45 degrees

## 2023-03-20 NOTE — HISTORY OF PRESENT ILLNESS
[Neck] : neck [Lower back] : lower back [Work related] : work related [5] : 5 [3] : 3 [Sharp] : sharp [Shooting] : shooting [Intermittent] : intermittent [Heat] : heat [Sitting] : sitting [Standing] : standing [Walking] : walking [Retired] : Work status: retired [Injection therapy] : injection therapy [FreeTextEntry1] : 3/20/23- fu for right C2-C5 RFA on 3/6 with 20% relief so far.  \par \par 02/8/23: follow up today. He was unable to get his injection in November as his ride had cancelled on him. His pain is in his lower back and his neck.  Intermittent numbness in the right hand. Pain in the lower back with intermittent radiation down both posterior legs. \par \par 04/18/2022: follow up today.  Had 80% relief from right cervical RFA on 2/16/22 and 60% relief from LESI L5-S1 on 2/23/22.\par \par 11/10/21: follow up today. Not seen by me since 2018. Has been following with Dr. Mijares. Stable on current meds.Pain is in neck. Pain is in the right side of neck and occasionally gets numbness into right hand. Pain in back is in both sides and will go down right leg. Had LESI's with me back in 2017 with improvement of his pain >50% with improvement of his function and overall well being. Has been off of narcotics for some time. He has continued pain in the right neck, had 2 successful cervical MBBs in the past. using heat PRN.\par \par \par 2020: Per Dr. Lopez: He c/o neck and low back pain as a result of a work related injury on 10/9/15. No change in pain pattern. Obtains significant benefit with physical therapy, improvement in ROM and he is able to perform ADLs. Had right cervical MBB x2 with reported 15% improvement. States continued sustained relief after last left cervical RFA 6/2019.\par \par Attempted modalities for current pain complaint:\par See above:\par \par Medications: Yes\par 1) Celebrex PRN\par 2) Gabapentin 300 mg qhs\par \par Injections: Yes\par 1) Left C2-C3, C3-C4, C4-C5 RFA (6/25/18, 6/24/19) \par 2) Right C2-C3, C3-C4, C4-C5 MBB (7/8/19, 12/9/19) \par 3) Right cervical RFA (2/16/22)\par 4) LESI L5/S1 (2/23/22)\par \par Previous Spine Surgery: N/A\par Imaging:\par MRI Cervical Spine (1/16/19) - O/C C2-C3: There is no posterior disc herniation. \par C3-C4: There is diffuse disc bulging, bony ridging, right foraminal protrusion, uncovertebral joint hypertrophy, and \par right exiting C4 nerve root impingement with right greater than left foraminal narrowing. \par C4-C5: There is diffuse disc bulging and bony ridging asymmetric towards the left with uncovertebral joint \par hypertrophy and moderate left greater than right neural foraminal narrowing. \par C5-C6: There is mild diffuse disc bulging, bony ridging, and mild foraminal narrowing left greater than right. \par C6-C7: There is mild diffuse disc bulging, bony ridging, and foraminal narrowing, left greater than right. \par C7-T1: There is diffuse disc bulging, bony ridging, and uncovertebral joint hypertrophy with encroachment upon left  greater than right exiting C8 nerve roots. \par \par MRI Lumbar Spine (11/4/19) - O/C [] : no [FreeTextEntry3] : 10/9/15 [FreeTextEntry7] : right leg [FreeTextEntry9] : Stretch

## 2023-03-22 ENCOUNTER — RX CHANGE (OUTPATIENT)
Age: 68
End: 2023-03-22

## 2023-03-22 RX ORDER — LIDOCAINE 5% 700 MG/1
5 PATCH TOPICAL
Qty: 30 | Refills: 2 | Status: DISCONTINUED | COMMUNITY
Start: 2022-04-13 | End: 2023-03-22

## 2023-03-28 ENCOUNTER — FORM ENCOUNTER (OUTPATIENT)
Age: 68
End: 2023-03-28

## 2023-05-03 ENCOUNTER — APPOINTMENT (OUTPATIENT)
Dept: ORTHOPEDIC SURGERY | Facility: CLINIC | Age: 68
End: 2023-05-03
Payer: OTHER MISCELLANEOUS

## 2023-05-03 DIAGNOSIS — M54.2 CERVICALGIA: ICD-10-CM

## 2023-05-03 DIAGNOSIS — M54.16 RADICULOPATHY, LUMBAR REGION: ICD-10-CM

## 2023-05-03 PROCEDURE — 99214 OFFICE O/P EST MOD 30 MIN: CPT

## 2023-05-03 RX ORDER — CELECOXIB 100 MG/1
100 CAPSULE ORAL TWICE DAILY
Qty: 60 | Refills: 2 | Status: ACTIVE | COMMUNITY
Start: 2022-04-13 | End: 1900-01-01

## 2023-05-03 RX ORDER — LIDOCAINE 5% 700 MG/1
5 PATCH TOPICAL
Qty: 30 | Refills: 2 | Status: ACTIVE | COMMUNITY
Start: 2023-03-22 | End: 1900-01-01

## 2023-05-03 RX ORDER — GABAPENTIN 300 MG/1
300 CAPSULE ORAL 3 TIMES DAILY
Qty: 90 | Refills: 0 | Status: ACTIVE | COMMUNITY
Start: 2023-01-11 | End: 1900-01-01

## 2023-05-03 NOTE — HISTORY OF PRESENT ILLNESS
[Neck] : neck [Upper back] : upper back [Mid-back] : mid-back [Lower back] : lower back [Gradual] : gradual [Sudden] : sudden [10] : 10 [6] : 6 [Burning] : burning [Sharp] : sharp [Shooting] : shooting [Stabbing] : stabbing [Throbbing] : throbbing [Intermittent] : intermittent [Walking] : walking [Exercising] : exercising [Stairs] : stairs [Retired] : Work status: retired [de-identified] : WC 10/9/15\par \par 6/24/20: here for fu neck/back - neck bothering him quite a bit - right arm bothering him quite a bit \par \par Lower back pain remains - radiates down the back of the rigth leg\par \par Using gabapentin/Celebrex\par \par xrays today:\par C spine 4 views - loss of disc height from C5-7\par \par 9/28/20: Here for f/u - plan at last was "medication renewed - PT in order to reduce the symptoms - requires medication in order to control the symptoms - the pain remains either way but there is less with the medication and without he basically cant move at all- fu with me in a few months" - overall the pain remains in the same capacity - pain ranging from 5-10 based on activity - left arm is okay - all the way down the right arm - medications help - has medications - right hand is okay - no loss of bb control- PT helps quite a bit currently\par \par Lower back pain as well \par \par Not able to work in any capacity at this point\par \par 1/11/21: Here to follow up. Plan at last visit was "Celebrex/gabapenitin refilled - not able to return to work - hes going to see Dr Bradley for injections"\par \par 5/19/21: Here for f/u. Plan at last "We discussed case. Having recent GI symptoms. Will add Pepcid 20mg bid and decrease Celebrex to 100mg. Will try to hold Celebrex and see if better. Denies bloody stools. Will follow up with GI"\par \par 6/30/21: Here for f/u. Plan at last "Celebrex/gabapentin. He requires Celebrex due to other medications on the formulary not compatible with his GI issues.\par He requires Gabapentin for neuropathic pain and there is no substitute" He is at baseline with his pain and sx. He recently received his Celebrex a few days ago and it has been beneficial to his pain and sx\par \par 8/11/21: Here for f/u. Plan at last "Celebrex/gabapenitin. He requires Celebrex due to other medications on the formulary not compatible with his GI issues.\par He requires Gabapentin for neuropathic pain and there is no substitute." He continues to remain at his baseline with pain and dysfunction. He has been doing well with PT, Celebrex, and gabapentin.\par \par 10/6/21: Here for f/u. Plan at last "Celebrex/gabapenitin. He requires Celebrex due to other medications on the formulary not compatible with his GI issues.\par He requires Gabapentin for neuropathic pain and there is no substitute." He had a flare up of the pain a month ago, possibly due to doing yard work at the time. Today, he is at his baseline with pain and dysfunction. Continues to do well with PT, Celebrex, and gabapentin.\par \par 11/24/21: Here for f/up. He has been doing well with PT, Celebrex, and gabapentin. He does not need refill today. He is currently waiting on approval for injections with Dr. Bradley.\par \par 1/26/22: Here for f/up. He has less stomach issues now when switched from Celebrex 200mg to 100mg. Doing well with PT, Celebrex, and gabapentin. No significant changes in his symptoms and function.\par \par 4/13/22: Here for follow-up; plan last visit was, "Continue with PT, Celebrex, and gabapentin. Sent lido topical gel as he has benefited from this in the past.He is working on scheduling LESIs with Dr. Bradley. f/up in 2-3 months." Overall doing a little better - he had cervical ablations as well as lumbar HO with about 40% improvement.  Pain is to the base of the neck  radiating down the right arm and low back with predominanty radiatiing down the right leg. He does PT about one monthly which he finds beneficial, and also takes the gabapentin and celebrex. He is retired.\par \par 7/13/22: Here for follow up. Overall about the same. Lidoderm patches give mild help. Continues PT and HEP. \par \par 10/12/22: Here for fu; plan last visit was, " Stable on medications - it is medically necessary for him to have the lidoderm patches, gabapentin and the celebrex as these medications have provided long term relief - continue PT once monthly - FU with Pain mngmt - no change in disability - fu 3 months." Overall pain to the right side of the neck that persists with stiffness. Shooting pain down the right arm at times. The neck is worse today. Pain persists also to the low back; he takes the celebrex, lidoderm patches and gabapentin with partial relief of his pain. He is retired. \par \par \par 01/11/23: Here for fu; plan last visit was, " Stable on medications - it is medically necessary for him to have the lidoderm patches, gabapentin and the celebrex as these medications have provided long term relief - continue PT-  FU with Pain mngmt - TPI given to the right paraspinal area -no change in disability - fu 3 months."  Overall is stable on medication. The PT has been helpful; He has continued pain radiating down the right arm and right leg that is intermittent. He was unable to make his last pain management appointment. \par \par 5/3/23: Here for fu on the neck and the back; Had recent MICHAEL with Dr Bradley with some improvement. He has been taking the celebrex and gabapentin with some relief. Recently raised the gabapentin to TID, but he has just received the medications and has not noticed a difference yet. Pain is axial in the back today, occasionally shoots downs the right leg.  He has been doing the PT with some relief as well. He has been having cardiac issues and chest pain, so he will hold off on LESI for now. \par  [] : Post Surgical Visit: no [FreeTextEntry1] : C spine and L spine  [FreeTextEntry7] : down both legs  [de-identified] : yard work  [de-identified] : Physical Therapy and an epidural \par \par tpi injection  [de-identified] : 2/2022

## 2023-05-03 NOTE — DISCUSSION/SUMMARY
[de-identified] : Stable on medications - it is medically necessary for him to have the lidoderm patches, gabapentin and the celebrex as these medications have provided long term relief; - continue PT-  FU with Pain mngmt; will hold off on further injections due to cardiac issues. no change in disability - fu 3 months.\par \par \par Patient seen by "Delfina KWAN" under the supervision of "Dr. Shaun Mijares"\par \par

## 2023-05-03 NOTE — PHYSICAL EXAM
[Flexion] : flexion [Extension] : extension [Rotation to left] : rotation to left [Rotation to right] : rotation to right [1] : right 1st digit [2] : right 2nd digit [3] : right 3rd digit [4] : right 4th digit [5] : right 5th digit [] : sensation of right upper extremities not intact [TWNoteComboBox7] : forward flexion 30 degrees [de-identified] : extension 10 degrees

## 2023-05-03 NOTE — WORK
[Does not reveal pre-existing condition(s) that may affect treatment/prognosis] : does not reveal pre-existing condition(s) that may affect treatment/prognosis [Cannot return to work because ________] : cannot return to work because [unfilled] [Rx may affect patient's ability to return to work, make patient drowsy, or other issue] : Rx may affect patient's ability to return to work, make patient drowsy, or other issue. [I provided the services listed above] :  I provided the services listed above.

## 2023-05-08 ENCOUNTER — RX CHANGE (OUTPATIENT)
Age: 68
End: 2023-05-08

## 2023-05-24 ENCOUNTER — RX CHANGE (OUTPATIENT)
Age: 68
End: 2023-05-24

## 2023-06-09 ENCOUNTER — RX CHANGE (OUTPATIENT)
Age: 68
End: 2023-06-09

## 2023-08-09 ENCOUNTER — APPOINTMENT (OUTPATIENT)
Dept: ORTHOPEDIC SURGERY | Facility: CLINIC | Age: 68
End: 2023-08-09